# Patient Record
Sex: FEMALE | HISPANIC OR LATINO | URBAN - METROPOLITAN AREA
[De-identification: names, ages, dates, MRNs, and addresses within clinical notes are randomized per-mention and may not be internally consistent; named-entity substitution may affect disease eponyms.]

---

## 2020-10-16 ENCOUNTER — OFFICE VISIT (OUTPATIENT)
Dept: URBAN - METROPOLITAN AREA TELEHEALTH 2 | Facility: TELEHEALTH | Age: 40
End: 2020-10-16
Payer: SELF-PAY

## 2020-10-16 DIAGNOSIS — R10.13 EPIGASTRIC PAIN: ICD-10-CM

## 2020-10-16 DIAGNOSIS — K59.09 CHRONIC CONSTIPATION: ICD-10-CM

## 2020-10-16 PROCEDURE — G9903 PT SCRN TBCO ID AS NON USER: HCPCS | Performed by: INTERNAL MEDICINE

## 2020-10-16 PROCEDURE — G8420 CALC BMI NORM PARAMETERS: HCPCS | Performed by: INTERNAL MEDICINE

## 2020-10-16 PROCEDURE — 99214 OFFICE O/P EST MOD 30 MIN: CPT | Performed by: INTERNAL MEDICINE

## 2020-10-16 PROCEDURE — G8484 FLU IMMUNIZE NO ADMIN: HCPCS | Performed by: INTERNAL MEDICINE

## 2020-10-16 PROCEDURE — G8427 DOCREV CUR MEDS BY ELIG CLIN: HCPCS | Performed by: INTERNAL MEDICINE

## 2020-10-16 PROCEDURE — 1036F TOBACCO NON-USER: CPT | Performed by: INTERNAL MEDICINE

## 2020-10-16 NOTE — HPI-TODAY'S VISIT:
This is a Telephone OV to which patient has agreed to. 39 yo patient, seen recently for rectal bleeding. Colonoscopy: normal x for proctitis: Path: prolapse changes. Since, she has had no recurrent rectal bleeding. She does admits to some constipation w/ ++ straining at stools. Has just increased fiber in her diet, added prunes qd, and now having moreregular bm's, again w/o bleeding. Today, she reports having had 3 episodes of suddent, p-prandial severe epigastric pain w/ epigastric distention w/ nausea and no emesis and w/o radiation. Since, she has been experiencing p-prandial epigastric fullness and  early satiety w/ nausea w/o emesis.  Improved w/ 5 small meals a day. NO anorexia or weight loss. She has no fever, chills nor cardiorespiratory sxs. No weight loss.  Recent labs : normal AMA, + CHIDI, + anti-centromere and normal IgGM.  Patient w/ Scleroderma / Raynaud's on Plaquenil bein followed by Rheumatology.

## 2020-10-23 ENCOUNTER — OFFICE VISIT (OUTPATIENT)
Dept: URBAN - METROPOLITAN AREA SURGERY CENTER 18 | Facility: SURGERY CENTER | Age: 40
End: 2020-10-23
Payer: SELF-PAY

## 2020-10-23 ENCOUNTER — CLAIMS CREATED FROM THE CLAIM WINDOW (OUTPATIENT)
Dept: URBAN - METROPOLITAN AREA CLINIC 4 | Facility: CLINIC | Age: 40
End: 2020-10-23
Payer: SELF-PAY

## 2020-10-23 DIAGNOSIS — K29.40 CHRONIC ATROPHIC GASTRITIS WITHOUT BLEEDING: ICD-10-CM

## 2020-10-23 DIAGNOSIS — K21.9 ACID REFLUX: ICD-10-CM

## 2020-10-23 DIAGNOSIS — K31.89 OTHER DISEASES OF STOMACH AND DUODENUM: ICD-10-CM

## 2020-10-23 DIAGNOSIS — K29.40 ATROPHIC GASTRITIS: ICD-10-CM

## 2020-10-23 DIAGNOSIS — K29.60 OTHER GASTRITIS WITHOUT BLEEDING: ICD-10-CM

## 2020-10-23 DIAGNOSIS — K31.89 ACQUIRED DEFORMITY OF DUODENUM: ICD-10-CM

## 2020-10-23 DIAGNOSIS — K21.0 GASTRO-ESOPHAGEAL REFLUX DISEASE WITH ESOPHAGITIS: ICD-10-CM

## 2020-10-23 PROCEDURE — 43239 EGD BIOPSY SINGLE/MULTIPLE: CPT | Performed by: INTERNAL MEDICINE

## 2020-10-23 PROCEDURE — 88305 TISSUE EXAM BY PATHOLOGIST: CPT | Performed by: PATHOLOGY

## 2020-10-23 PROCEDURE — 88312 SPECIAL STAINS GROUP 1: CPT | Performed by: PATHOLOGY

## 2020-10-23 PROCEDURE — 88341 IMHCHEM/IMCYTCHM EA ADD ANTB: CPT | Performed by: PATHOLOGY

## 2020-10-23 PROCEDURE — G8907 PT DOC NO EVENTS ON DISCHARG: HCPCS | Performed by: INTERNAL MEDICINE

## 2020-10-23 PROCEDURE — 88342 IMHCHEM/IMCYTCHM 1ST ANTB: CPT | Performed by: PATHOLOGY

## 2020-11-03 ENCOUNTER — OFFICE VISIT (OUTPATIENT)
Dept: URBAN - METROPOLITAN AREA CLINIC 97 | Facility: CLINIC | Age: 40
End: 2020-11-03
Payer: SELF-PAY

## 2020-11-03 DIAGNOSIS — K80.20 ASYMPTOMATIC CHOLELITHIASIS: ICD-10-CM

## 2020-11-03 DIAGNOSIS — R10.13 EPIGASTRIC ABDOMINAL PAIN: ICD-10-CM

## 2020-11-03 PROCEDURE — 76705 ECHO EXAM OF ABDOMEN: CPT | Performed by: INTERNAL MEDICINE

## 2020-11-10 ENCOUNTER — OFFICE VISIT (OUTPATIENT)
Dept: URBAN - METROPOLITAN AREA CLINIC 98 | Facility: CLINIC | Age: 40
End: 2020-11-10
Payer: SELF-PAY

## 2020-11-10 DIAGNOSIS — K80.20 GALLSTONES: ICD-10-CM

## 2020-11-10 DIAGNOSIS — K59.04 FUNCTIONAL CONSTIPATION: ICD-10-CM

## 2020-11-10 DIAGNOSIS — K31.84 GASTROPARESIS: ICD-10-CM

## 2020-11-10 DIAGNOSIS — K29.40 ATROPHIC GASTRITIS WITHOUT HEMORRHAGE: ICD-10-CM

## 2020-11-10 DIAGNOSIS — D51.0 PERNICIOUS ANEMIA: ICD-10-CM

## 2020-11-10 DIAGNOSIS — R11.0 CHRONIC NAUSEA: ICD-10-CM

## 2020-11-10 DIAGNOSIS — M34.9 SCLERODERMA: ICD-10-CM

## 2020-11-10 PROCEDURE — G8427 DOCREV CUR MEDS BY ELIG CLIN: HCPCS | Performed by: INTERNAL MEDICINE

## 2020-11-10 PROCEDURE — 1036F TOBACCO NON-USER: CPT | Performed by: INTERNAL MEDICINE

## 2020-11-10 PROCEDURE — G8420 CALC BMI NORM PARAMETERS: HCPCS | Performed by: INTERNAL MEDICINE

## 2020-11-10 PROCEDURE — 82607 VITAMIN B-12: CPT | Performed by: INTERNAL MEDICINE

## 2020-11-10 PROCEDURE — 99214 OFFICE O/P EST MOD 30 MIN: CPT | Performed by: INTERNAL MEDICINE

## 2020-11-10 PROCEDURE — G9903 PT SCRN TBCO ID AS NON USER: HCPCS | Performed by: INTERNAL MEDICINE

## 2020-11-10 PROCEDURE — G8482 FLU IMMUNIZE ORDER/ADMIN: HCPCS | Performed by: INTERNAL MEDICINE

## 2020-11-10 NOTE — HPI-TODAY'S VISIT:
39 yo pt for abdominal pain f/u. She has been c/o  persistent p-prandial nausea and bilious emesis at times, w/o hematemesis, unrelated to the type of food she eats. Last week had 2 days w/ emesis. Has been on 5 small meals w/ some improvement in sxs. She has had no melenic stools. No billy NICOLLE sxs and denies nocturnal reflux. No weight loss and no fever or chills. Less constipated w/ ingestion of prunes. She did stop on her own her antidepressant, Amlodipine, Plaquenil due to SE. Has kept taking transfer factor supplement, probiotics, Vitamin D, Omeprazole and Vitamin B12 SL. She has had no constitutional sxs. No fever or chills.  EGD 10/20: GERD w/o BE, + AMAG w/ focal IM w/o dysplasia. Colonoscopy 5/20: Proctitis; Bx's showed mucosal prolapse. RUQ-US: multiple gallstones w/o GB wall thickening and no pericholecystic fluid. No other complaints after extensive ROS.

## 2020-11-11 PROBLEM — 84027009 PERNICIOUS ANEMIA: Status: ACTIVE | Noted: 2020-11-10

## 2020-11-11 PROBLEM — 197118003 FUNCTIONAL CONSTIPATION: Status: ACTIVE | Noted: 2020-11-11

## 2020-11-11 PROBLEM — 235919008 GALLSTONES: Status: ACTIVE | Noted: 2020-11-10

## 2020-11-12 LAB
ANTIPARIETAL CELL ANTIBODY: 89.1
INTRINSIC FACTOR ABS, SERUM: 1.1
VITAMIN B12: 289

## 2021-03-26 ENCOUNTER — OFFICE VISIT (OUTPATIENT)
Dept: URBAN - METROPOLITAN AREA TELEHEALTH 2 | Facility: TELEHEALTH | Age: 41
End: 2021-03-26
Payer: SELF-PAY

## 2021-03-26 ENCOUNTER — DASHBOARD ENCOUNTERS (OUTPATIENT)
Age: 41
End: 2021-03-26

## 2021-03-26 DIAGNOSIS — K31.84 GASTROPARESIS: ICD-10-CM

## 2021-03-26 DIAGNOSIS — K80.20 CALCULUS OF GALLBLADDER WITHOUT CHOLECYSTITIS WITHOUT OBSTRUCTION: ICD-10-CM

## 2021-03-26 DIAGNOSIS — K59.04 CHRONIC IDIOPATHIC CONSTIPATION: ICD-10-CM

## 2021-03-26 DIAGNOSIS — K29.40 ATROPHIC GASTRITIS WITHOUT HEMORRHAGE: ICD-10-CM

## 2021-03-26 PROBLEM — 235675006 GASTROPARESIS: Status: ACTIVE | Noted: 2020-11-10

## 2021-03-26 PROBLEM — 422801000 SCLERODERMA: Status: ACTIVE | Noted: 2020-11-11

## 2021-03-26 PROBLEM — 84568007 ATROPHIC GASTRITIS: Status: ACTIVE | Noted: 2020-11-11

## 2021-03-26 PROBLEM — 82934008: Status: ACTIVE | Noted: 2021-03-26

## 2021-03-26 PROBLEM — 70342003: Status: ACTIVE | Noted: 2021-03-26

## 2021-03-26 PROCEDURE — 99214 OFFICE O/P EST MOD 30 MIN: CPT | Performed by: INTERNAL MEDICINE

## 2021-03-26 NOTE — HPI-TODAY'S VISIT:
This is a Telephone OV to which patient has agreed to. 39 yo pt w/ Hx Scleroderma, GERD,  Pernicious anemia w/ normal B12 level, known multiple gallstones, who had a severe episode of sudden onset of epigastric pain w/ radiation to the back w. concomitant nausea w/o emesis, resolved after few hours. No recurrence since. NICOLLE sxs are controlled w/ diet and on-demand OTC PPI. No alarm sxs. Has had no diarrhea nor constipation and no GIB. NO anorexia or weight loss. She has stopped Plaquenil and has been on Turmeric supplements and Moringa with good control of inflammation.  EGD 10/20: GERD w/o BE, AMAG w/ focal IM w/o dysplasia. Colonoscopy 5/20: Proctitis ( mucosal prolapse on bx's ). RUQ-US: multiple gallstones. No COVID-19 exposure. No COVID-19 vaccine ( she has a hx anaphylactic shock x 2 in the past ). She has no other complaints.

## 2021-04-15 ENCOUNTER — TELEPHONE ENCOUNTER (OUTPATIENT)
Dept: URBAN - METROPOLITAN AREA CLINIC 98 | Facility: CLINIC | Age: 41
End: 2021-04-15

## 2021-05-10 ENCOUNTER — TELEPHONE ENCOUNTER (OUTPATIENT)
Dept: URBAN - METROPOLITAN AREA SURGERY CENTER 30 | Facility: SURGERY CENTER | Age: 41
End: 2021-05-10

## 2021-05-24 ENCOUNTER — TELEPHONE ENCOUNTER (OUTPATIENT)
Dept: URBAN - METROPOLITAN AREA CLINIC 40 | Facility: CLINIC | Age: 41
End: 2021-05-24